# Patient Record
Sex: MALE | Race: WHITE | NOT HISPANIC OR LATINO | Employment: FULL TIME
[De-identification: names, ages, dates, MRNs, and addresses within clinical notes are randomized per-mention and may not be internally consistent; named-entity substitution may affect disease eponyms.]

---

## 2017-05-04 ENCOUNTER — RECORDS - HEALTHEAST (OUTPATIENT)
Dept: ADMINISTRATIVE | Facility: OTHER | Age: 42
End: 2017-05-04

## 2018-11-01 ENCOUNTER — COMMUNICATION - HEALTHEAST (OUTPATIENT)
Dept: TELEHEALTH | Facility: CLINIC | Age: 43
End: 2018-11-01

## 2018-11-01 ENCOUNTER — OFFICE VISIT - HEALTHEAST (OUTPATIENT)
Dept: INTERNAL MEDICINE | Facility: CLINIC | Age: 43
End: 2018-11-01

## 2018-11-01 DIAGNOSIS — Z12.5 PROSTATE CANCER SCREENING: ICD-10-CM

## 2018-11-01 DIAGNOSIS — Z00.00 PREVENTATIVE HEALTH CARE: ICD-10-CM

## 2018-11-01 LAB
ANION GAP SERPL CALCULATED.3IONS-SCNC: 13 MMOL/L (ref 5–18)
BUN SERPL-MCNC: 13 MG/DL (ref 8–22)
CALCIUM SERPL-MCNC: 10.6 MG/DL (ref 8.5–10.5)
CHLORIDE BLD-SCNC: 100 MMOL/L (ref 98–107)
CHOLEST SERPL-MCNC: 265 MG/DL
CO2 SERPL-SCNC: 28 MMOL/L (ref 22–31)
CREAT SERPL-MCNC: 1.17 MG/DL (ref 0.7–1.3)
FASTING STATUS PATIENT QL REPORTED: YES
GFR SERPL CREATININE-BSD FRML MDRD: >60 ML/MIN/1.73M2
GLUCOSE BLD-MCNC: 90 MG/DL (ref 70–125)
HDLC SERPL-MCNC: 76 MG/DL
LDLC SERPL CALC-MCNC: 172 MG/DL
POTASSIUM BLD-SCNC: 4.9 MMOL/L (ref 3.5–5)
PSA SERPL-MCNC: 1.2 NG/ML (ref 0–2.5)
SODIUM SERPL-SCNC: 141 MMOL/L (ref 136–145)
TRIGL SERPL-MCNC: 84 MG/DL

## 2018-11-01 RX ORDER — MULTIVITAMIN
1 CAPSULE ORAL DAILY
Status: SHIPPED | COMMUNITY
Start: 2018-11-01

## 2018-11-01 ASSESSMENT — MIFFLIN-ST. JEOR: SCORE: 1626.96

## 2018-11-02 ENCOUNTER — COMMUNICATION - HEALTHEAST (OUTPATIENT)
Dept: INTERNAL MEDICINE | Facility: CLINIC | Age: 43
End: 2018-11-02

## 2018-11-02 LAB — 25(OH)D3 SERPL-MCNC: 39.8 NG/ML (ref 30–80)

## 2021-06-02 VITALS — WEIGHT: 169 LBS | HEIGHT: 69 IN | BODY MASS INDEX: 25.03 KG/M2

## 2021-06-21 NOTE — PROGRESS NOTES
ASSESSMENT:  1. Preventative health care  Very healthy.  No symptoms.  Update labs  - Vitamin D, Total (25-Hydroxy)  - Lipid Cascade  - Basic Metabolic Panel    2. Prostate cancer screening  Family history of prostate cancer father and grandfather.  Recommend yearly PSA.  Digital rectal exam discussed  - PSA (Prostatic-Specific Antigen), Annual Screen      PLAN:  Patient Instructions   Update blood tests today    Yearly PSA's, lab       Orders Placed This Encounter   Procedures     PSA (Prostatic-Specific Antigen), Annual Screen     Vitamin D, Total (25-Hydroxy)     Lipid Cascade     Order Specific Question:   Fasting is required?     Answer:   Unknown     Basic Metabolic Panel     There are no discontinued medications.    Return in about 1 year (around 11/1/2019) for Annual physical or blood tests.    CHIEF COMPLAINT:  Chief Complaint   Patient presents with     Annual Exam       HISTORY OF PRESENT ILLNESS:  Antony is a 43 y.o. male presenting to the clinic today for an annual physical exam. He has not been seen for about five years and does not have any acute concerns today.     Health Maintenance: He would like to have a PSA checked due to his family history. He does not typically get flu shots and is not due for any other immunizations.     REVIEW OF SYSTEMS:   He has been feeling healthy. He does not have sinus problems or allergies. He sleeps well. Denies acid reflux. Bowels move fine and denies urine trouble. Has not had any asthma or breathing problems. No lower extremity edema. All other systems are negative.    PFSH:  He has a history of early prostate cancer in his paternal grandfather in his 60s. His father also had prostate cancer, which was diagnosed at 66. He does not have a family history of colon cancer. He exercises two or three times per week. He does not smoke. He has two children. He works in real estate finance.     History   Smoking Status     Never Smoker   Smokeless Tobacco     Never Used  "      Family History   Problem Relation Age of Onset     Prostate cancer Father      Prostate cancer Paternal Grandfather        Social History     Social History     Marital status:      Spouse name: N/A     Number of children: N/A     Years of education: N/A     Occupational History     Not on file.     Social History Main Topics     Smoking status: Never Smoker     Smokeless tobacco: Never Used     Alcohol use Not on file     Drug use: Not on file     Sexual activity: Not on file     Other Topics Concern     Not on file     Social History Narrative       Past Surgical History:   Procedure Laterality Date     [place] APPENDECTOMY      Description: Appendectomy;  Recorded: 06/19/2013;       Allergies   Allergen Reactions     Penicillins Anaphylaxis       Active Ambulatory Problems     Diagnosis Date Noted     No Active Ambulatory Problems     Resolved Ambulatory Problems     Diagnosis Date Noted     No Resolved Ambulatory Problems     No Additional Past Medical History       VITALS:  Vitals:    11/01/18 0927   BP: 106/74   Patient Site: Left Arm   Patient Position: Sitting   Cuff Size: Adult Regular   Pulse: 60   Weight: 169 lb (76.7 kg)   Height: 5' 9\" (1.753 m)     Wt Readings from Last 3 Encounters:   11/01/18 169 lb (76.7 kg)     Body mass index is 24.96 kg/(m^2).    PHYSICAL EXAM:  Constitutional:  Reveals an alert, pleasant, healthy appearing, well-groomed man.  Vitals:  Per nursing notes.  Ears:  Clear.  Oropharynx:  Without posterior nasal drainage or thrush.  Neck:  Supple, thyroid not palpable.  Cardiac:  Regular rate and rhythm without murmurs, rubs, or gallops. Legs without edema. Palpation of the radial pulse regular.  Lungs: Clear.  Respiratory effort normal.  Abdomen:   Bowel sounds positive, nontender, nondistended.  Neither liver nor spleen palpable.  Skin:   Without rash, bruise, or palpable lesions.  Rheumatologic: Normal joints and nails of the hands.  Neurologic:  Cranial nerves II-XII " intact.     Psychiatric:  Mood appropriate, memory intact.     MEDICATIONS:  Current Outpatient Prescriptions   Medication Sig Dispense Refill     multivitamin capsule Take 1 capsule by mouth daily.       No current facility-administered medications for this visit.        ADDITIONAL HISTORY SUMMARIZED (2): None.  DECISION TO OBTAIN EXTRA INFORMATION (1): None.   RADIOLOGY TESTS (1): None.  LABS (1): Labs from 6/19/2013 reviewed. Labs ordered.   MEDICINE TESTS (1): None.  INDEPENDENT REVIEW (2 each): None.     The visit lasted a total of 11 minutes face to face with the patient. Over 50% of the time was spent counseling and educating the patient about general health maintenance.    Renzo BLANCO, am scribing for and in the presence of, Dr. Melendrez.    IDr. Melendrez, personally performed the services described in this documentation, as scribed by Renzo Arroyo in my presence, and it is both accurate and complete.    Total data points: 1

## 2021-08-22 ENCOUNTER — HEALTH MAINTENANCE LETTER (OUTPATIENT)
Age: 46
End: 2021-08-22

## 2021-10-17 ENCOUNTER — HEALTH MAINTENANCE LETTER (OUTPATIENT)
Age: 46
End: 2021-10-17

## 2022-10-02 ENCOUNTER — HEALTH MAINTENANCE LETTER (OUTPATIENT)
Age: 47
End: 2022-10-02

## 2023-08-22 ASSESSMENT — ENCOUNTER SYMPTOMS
FREQUENCY: 0
CHILLS: 0
FEVER: 0
HEMATOCHEZIA: 0
COUGH: 0
EYE PAIN: 0
SORE THROAT: 0
MYALGIAS: 0
WEAKNESS: 0
JOINT SWELLING: 0
HEADACHES: 0
NAUSEA: 0
DIARRHEA: 0
NERVOUS/ANXIOUS: 0
SHORTNESS OF BREATH: 0
ARTHRALGIAS: 0
DYSURIA: 0
PALPITATIONS: 0
PARESTHESIAS: 0
DIZZINESS: 0
HEARTBURN: 0
HEMATURIA: 0
ABDOMINAL PAIN: 0
CONSTIPATION: 0

## 2023-08-29 ENCOUNTER — LAB (OUTPATIENT)
Dept: INTERNAL MEDICINE | Facility: CLINIC | Age: 48
End: 2023-08-29

## 2023-08-29 ENCOUNTER — OFFICE VISIT (OUTPATIENT)
Dept: INTERNAL MEDICINE | Facility: CLINIC | Age: 48
End: 2023-08-29
Payer: COMMERCIAL

## 2023-08-29 VITALS
HEART RATE: 60 BPM | HEIGHT: 70 IN | BODY MASS INDEX: 23.62 KG/M2 | OXYGEN SATURATION: 98 % | TEMPERATURE: 98 F | WEIGHT: 165 LBS | SYSTOLIC BLOOD PRESSURE: 109 MMHG | RESPIRATION RATE: 12 BRPM | DIASTOLIC BLOOD PRESSURE: 70 MMHG

## 2023-08-29 DIAGNOSIS — Z12.5 PROSTATE CANCER SCREENING: ICD-10-CM

## 2023-08-29 DIAGNOSIS — Z11.59 NEED FOR HEPATITIS C SCREENING TEST: ICD-10-CM

## 2023-08-29 DIAGNOSIS — Z11.4 SCREENING FOR HIV (HUMAN IMMUNODEFICIENCY VIRUS): ICD-10-CM

## 2023-08-29 DIAGNOSIS — Z12.11 SCREEN FOR COLON CANCER: ICD-10-CM

## 2023-08-29 DIAGNOSIS — Z00.00 PREVENTATIVE HEALTH CARE: Primary | ICD-10-CM

## 2023-08-29 LAB
ANION GAP SERPL CALCULATED.3IONS-SCNC: 11 MMOL/L (ref 7–15)
BUN SERPL-MCNC: 18.2 MG/DL (ref 6–20)
CALCIUM SERPL-MCNC: 9.6 MG/DL (ref 8.6–10)
CHLORIDE SERPL-SCNC: 104 MMOL/L (ref 98–107)
CHOLEST SERPL-MCNC: 233 MG/DL
CREAT SERPL-MCNC: 1.14 MG/DL (ref 0.67–1.17)
DEPRECATED HCO3 PLAS-SCNC: 28 MMOL/L (ref 22–29)
GFR SERPL CREATININE-BSD FRML MDRD: 78 ML/MIN/1.73M2
GLUCOSE SERPL-MCNC: 84 MG/DL (ref 70–99)
HDLC SERPL-MCNC: 69 MG/DL
LDLC SERPL CALC-MCNC: 114 MG/DL
NONHDLC SERPL-MCNC: 164 MG/DL
POTASSIUM SERPL-SCNC: 4.2 MMOL/L (ref 3.4–5.3)
PSA SERPL DL<=0.01 NG/ML-MCNC: 1.37 NG/ML (ref 0–3.5)
SODIUM SERPL-SCNC: 143 MMOL/L (ref 136–145)
TRIGL SERPL-MCNC: 248 MG/DL

## 2023-08-29 PROCEDURE — 86803 HEPATITIS C AB TEST: CPT | Performed by: INTERNAL MEDICINE

## 2023-08-29 PROCEDURE — 87389 HIV-1 AG W/HIV-1&-2 AB AG IA: CPT | Performed by: INTERNAL MEDICINE

## 2023-08-29 PROCEDURE — 80061 LIPID PANEL: CPT | Performed by: INTERNAL MEDICINE

## 2023-08-29 PROCEDURE — G0103 PSA SCREENING: HCPCS | Performed by: INTERNAL MEDICINE

## 2023-08-29 PROCEDURE — 36415 COLL VENOUS BLD VENIPUNCTURE: CPT | Performed by: INTERNAL MEDICINE

## 2023-08-29 PROCEDURE — 80048 BASIC METABOLIC PNL TOTAL CA: CPT | Performed by: INTERNAL MEDICINE

## 2023-08-29 PROCEDURE — 99386 PREV VISIT NEW AGE 40-64: CPT | Performed by: INTERNAL MEDICINE

## 2023-08-29 ASSESSMENT — ENCOUNTER SYMPTOMS
PALPITATIONS: 0
DIZZINESS: 0
JOINT SWELLING: 0
CONSTIPATION: 0
DIARRHEA: 0
PARESTHESIAS: 0
CHILLS: 0
FEVER: 0
SHORTNESS OF BREATH: 0
ABDOMINAL PAIN: 0
HEMATOCHEZIA: 0
COUGH: 0
NAUSEA: 0
MYALGIAS: 0
ARTHRALGIAS: 0
EYE PAIN: 0
SORE THROAT: 0
DYSURIA: 0
NERVOUS/ANXIOUS: 0
HEMATURIA: 0
WEAKNESS: 0
FREQUENCY: 0
HEADACHES: 0
HEARTBURN: 0

## 2023-08-29 NOTE — PATIENT INSTRUCTIONS
[facility]al for low risk cancer screening    Update labs:  PSA, lipds, kidney tests and sugar    New flu and new covid this fall    Look into shingles and tdap at your pharmacy

## 2023-08-29 NOTE — PROGRESS NOTES
"Yearly Preventive Health Visit--Face to Face    ASSESSMENT/PLAN:     ASSESSMENT and PLAN:  1. Preventative health care  - REVIEW OF HEALTH MAINTENANCE PROTOCOL ORDERS  - TDAP 10-64Y (ADACEL,BOOSTRIX); Future  - ZOSTER RECOMBINANT ADJUVANTED (SHINGRIX); Future  - Basic metabolic panel; Future  - Lipid Profile; Future  - Basic metabolic panel  - Lipid Profile    2. Screen for colon cancer  Conservative cancer screening is reasonable  - COLOGUARD(EXACT SCIENCES); Future    3. Screening for HIV (human immunodeficiency virus)  - HIV Antigen Antibody Combo; Future  - HIV Antigen Antibody Combo    4. Need for hepatitis C screening test  - Hepatitis C Screen Reflex to HCV RNA Quant and Genotype; Future  - Hepatitis C Screen Reflex to HCV RNA Quant and Genotype    5. Prostate cancer screening  - Prostate Specific Antigen Screen; Future  - Prostate Specific Antigen Screen       Patient Instructions   Cologuard for low risk cancer screening    Update labs:  PSA, lipds, kidney tests and sugar    New flu and new covid this fall    Look into shingles and tdap at your pharmacy              Return in about 1 year (around 8/29/2024).       COUNSELING:   Reviewed preventive health counseling, as reflected in patient instructions       Regular exercise       Consider Hep C screening for all patients one time for ages 18-79 years       Colorectal cancer screening       Prostate cancer screening    Estimated body mass index is 23.68 kg/m  as calculated from the following:    Height as of this encounter: 1.778 m (5' 10\").    Weight as of this encounter: 74.8 kg (165 lb).           SUBJECTIVE:     Antony Cristina is an 48 year old who presents for preventative health visit.         8/29/2023     1:44 PM   Additional Questions   Roomed by jeffrey rivas   Accompanied by self         8/29/2023     1:44 PM   Patient Reported Additional Medications   Patient reports taking the following new medications no       CHIEF COMPLAINT:  Chief Complaint "   Patient presents with    Physical    Recheck Medication       HPI: Feeling very well.  Concerns with prostate and breast cancer running in the family.  No chest pain or shortness of breath    Review of Systems   Constitutional:  Negative for chills and fever.   HENT:  Negative for congestion, ear pain, hearing loss and sore throat.    Eyes:  Negative for pain and visual disturbance.   Respiratory:  Negative for cough and shortness of breath.    Cardiovascular:  Negative for chest pain, palpitations and peripheral edema.   Gastrointestinal:  Negative for abdominal pain, constipation, diarrhea, heartburn, hematochezia and nausea.   Genitourinary:  Negative for dysuria, frequency, genital sores, hematuria, impotence, penile discharge and urgency.   Musculoskeletal:  Negative for arthralgias, joint swelling and myalgias.   Skin:  Negative for rash.   Neurological:  Negative for dizziness, weakness, headaches and paresthesias.   Psychiatric/Behavioral:  Negative for mood changes. The patient is not nervous/anxious.      Review of Systems:        Patient Care Team:  Pineda Melendrez MD as PCP - General (Internal Medicine)     There is no problem list on file for this patient.    No past medical history on file.   Past Surgical History:   Procedure Laterality Date    ZZC APPENDECTOMY      Description: Appendectomy;  Recorded: 06/19/2013;      Family History   Problem Relation Age of Onset    Prostate Cancer Father     Prostate Cancer Paternal Grandfather     Cancer Paternal Grandmother       Social History     Socioeconomic History    Marital status:      Spouse name: Not on file    Number of children: Not on file    Years of education: Not on file    Highest education level: Not on file   Occupational History    Not on file   Tobacco Use    Smoking status: Never    Smokeless tobacco: Never   Vaping Use    Vaping Use: Never used   Substance and Sexual Activity    Alcohol use: Not on file    Drug use: Not on  file    Sexual activity: Not on file   Other Topics Concern    Not on file   Social History Narrative    Not on file     Social Determinants of Health     Financial Resource Strain: Not on file   Food Insecurity: Not on file   Transportation Needs: Not on file   Physical Activity: Not on file   Stress: Not on file   Social Connections: Not on file   Intimate Partner Violence: Not on file   Housing Stability: Not on file      Wife had complications of a colonoscopy    Social History     Social History Narrative    Not on file       Current Outpatient Medications   Medication Sig Dispense Refill    multivitamin capsule [MULTIVITAMIN CAPSULE] Take 1 capsule by mouth daily.          Patient has been advised of split billing requirements and indicates understanding: Yes    Healthy Habits:     Getting at least 3 servings of Calcium per day:  Yes    Bi-annual eye exam:  Yes    Dental care twice a year:  Yes    Sleep apnea or symptoms of sleep apnea:  None    Diet:  Regular (no restrictions)    Frequency of exercise:  2-3 days/week    Duration of exercise:  30-45 minutes    Taking medications regularly:  Yes    Medication side effects:  None    Additional concerns today:  No      Today's PHQ-2 Score:       8/29/2023     1:36 PM   PHQ-2 ( 1999 Pfizer)   Q1: Little interest or pleasure in doing things 0   Q2: Feeling down, depressed or hopeless 0   PHQ-2 Score 0   Q1: Little interest or pleasure in doing things Not at all   Q2: Feeling down, depressed or hopeless Not at all   PHQ-2 Score 0       Abuse: Current or Past(Physical, Sexual or Emotional)- No   Do you feel safe in your environment? Yes     Have you ever done Advance Care Planning? (For example, a Health Directive, POLST, or a discussion with a medical provider or your loved ones about your wishes): No, advance care planning information given to patient to review.  Patient plans to discuss their wishes with loved ones or provider.      Reviewed and updated as needed  "this visit by clinical staff   Tobacco  Allergies  Meds  Problems               OBJECTIVE:   VITALS:  Vitals:    08/29/23 1346   BP: 109/70   BP Location: Left arm   Patient Position: Sitting   Cuff Size: Adult Regular   Pulse: 60   Resp: 12   Temp: 98  F (36.7  C)   TempSrc: Oral   SpO2: 98%   Weight: 74.8 kg (165 lb)   Height: 1.778 m (5' 10\")     /70 (BP Location: Left arm, Patient Position: Sitting, Cuff Size: Adult Regular)   Pulse 60   Temp 98  F (36.7  C) (Oral)   Resp 12   Ht 1.778 m (5' 10\")   Wt 74.8 kg (165 lb)   SpO2 98%   BMI 23.68 kg/m   Estimated body mass index is 23.68 kg/m  as calculated from the following:    Height as of this encounter: 1.778 m (5' 10\").    Weight as of this encounter: 74.8 kg (165 lb).    PHYSICAL EXAM:  Constitutional:  Reveals alert pleasant man who ambulates without difficulty  Palpation of radial pulse regular   Ears:  Clear without wax   Thyroid:  Non palpable   Cardiac: Regular rate and rhythm   Lungs: Clear.  Respiratory effort normal.  Edema of Legs: None   Psychiatric:  Alert and oriented         He reports that he has never smoked. He has never used smokeless tobacco.      No results for input(s): HGB, WBC, NA, POTASSIUM, CR, A1C, PSA, URIC, B12, TSH, VITDT, SED, CRP in the last 36380 hours.      Start Time: 2:07 PM  End time:  2:29 PM  Visit plus orders: 22 minutes  Dictation time:  3 minutes    The visit lasted a total of 25 minutes       Pineda Melendrez MD  [facility]  "

## 2023-08-29 NOTE — PROGRESS NOTES
SUBJECTIVE:   CC: Antony is an 48 year old who presents for preventative health visit.       8/29/2023     1:44 PM   Additional Questions   Roomed by jeffrey rivas   Accompanied by self         8/29/2023     1:44 PM   Patient Reported Additional Medications   Patient reports taking the following new medications no       Healthy Habits:     Getting at least 3 servings of Calcium per day:  Yes    Bi-annual eye exam:  Yes    Dental care twice a year:  Yes    Sleep apnea or symptoms of sleep apnea:  None    Diet:  Regular (no restrictions)    Frequency of exercise:  2-3 days/week    Duration of exercise:  30-45 minutes    Taking medications regularly:  Yes    Medication side effects:  None    Additional concerns today:  No      Today's PHQ-2 Score:       8/29/2023     1:36 PM   PHQ-2 ( 1999 Pfizer)   Q1: Little interest or pleasure in doing things 0   Q2: Feeling down, depressed or hopeless 0   PHQ-2 Score 0   Q1: Little interest or pleasure in doing things Not at all   Q2: Feeling down, depressed or hopeless Not at all   PHQ-2 Score 0           {Add if <65 person on Medicare  - Required Questions (Optional):802012}  {Outside tests to abstract? :341183}    {additional problems to add (Optional):791557}  Have you ever done Advance Care Planning? (For example, a Health Directive, POLST, or a discussion with a medical provider or your loved ones about your wishes): No, advance care planning information given to patient to review.  Patient plans to discuss their wishes with loved ones or provider.      Social History     Tobacco Use    Smoking status: Never    Smokeless tobacco: Never   Substance Use Topics    Alcohol use: Not on file     {Rooming staff  Click this link to complete the Prescreen if response below is not for today's visit  Alcohol Use Prescreen >3 drinks/day or > 7 drinks/week.  If the prescreen question answer is YES, complete the full AUDIT  :199464}        8/22/2023     4:41 PM   Alcohol Use   Prescreen: >3  "drinks/day or >7 drinks/week? Not Applicable   {add AUDIT responses (Optional) (A score of 7 for adult men is an indication of hazardous drinking; a score of 8 or more is an indication of an alcohol use disorder.  A score of 7 or more for adult women is an indication of hazardous drinking or an alchohol use disorder):760105}    Last PSA:   Prostate Specific Antigen Screen   Date Value Ref Range Status   11/01/2018 1.2 0.0 - 2.5 ng/mL Final       Reviewed orders with patient. Reviewed health maintenance and updated orders accordingly - { :613177}  {Chronicprobdata (optional):365564}    Reviewed and updated as needed this visit by clinical staff   Tobacco  Allergies  Meds              Reviewed and updated as needed this visit by Provider                 {HISTORY OPTIONS (Optional):512261}    Review of Systems   Constitutional:  Negative for chills and fever.   HENT:  Negative for congestion, ear pain, hearing loss and sore throat.    Eyes:  Negative for pain and visual disturbance.   Respiratory:  Negative for cough and shortness of breath.    Cardiovascular:  Negative for chest pain, palpitations and peripheral edema.   Gastrointestinal:  Negative for abdominal pain, constipation, diarrhea, heartburn, hematochezia and nausea.   Genitourinary:  Negative for dysuria, frequency, genital sores, hematuria, impotence, penile discharge and urgency.   Musculoskeletal:  Negative for arthralgias, joint swelling and myalgias.   Skin:  Negative for rash.   Neurological:  Negative for dizziness, weakness, headaches and paresthesias.   Psychiatric/Behavioral:  Negative for mood changes. The patient is not nervous/anxious.      {MALE ROS (Optional):166324}    OBJECTIVE:   /70 (BP Location: Left arm, Patient Position: Sitting, Cuff Size: Adult Regular)   Pulse 60   Temp 98  F (36.7  C) (Oral)   Resp 12   Ht 1.778 m (5' 10\")   Wt 74.8 kg (165 lb)   SpO2 98%   BMI 23.68 kg/m      Physical Exam  {Exam Choices " "(Optional):398489}    {Diagnostic Test Results (Optional):186008}    ASSESSMENT/PLAN:   {Diag Picklist:265088}    Patient has been advised of split billing requirements and indicates understanding: Yes      COUNSELING:   {MALE COUNSELING MESSAGES:006349::\"Reviewed preventive health counseling, as reflected in patient instructions\"}        He reports that he has never smoked. He has never used smokeless tobacco.      {Counseling Resources  [place] Preventive Services Task Force  Cholesterol Screening  Health diet/nutrition  Pooled Cohorts Equation Calculator  [employer]'s MyPlate  ASA Prophylaxis  Lung CA Screening  Osteoporosis prevention/bone health :610079}  {Prostate Cancer Screening  Consider for men 55-69 per guidance from USPSTF :457097}    Pineda Melendrez MD  [facility]  "

## 2023-08-30 LAB
HCV AB SERPL QL IA: NONREACTIVE
HIV 1+2 AB+HIV1 P24 AG SERPL QL IA: NONREACTIVE

## 2023-09-12 LAB — NONINV COLON CA DNA+OCC BLD SCRN STL QL: NEGATIVE

## 2024-01-25 ENCOUNTER — TRANSFERRED RECORDS (OUTPATIENT)
Dept: HEALTH INFORMATION MANAGEMENT | Facility: CLINIC | Age: 49
End: 2024-01-25
Payer: COMMERCIAL

## 2024-07-30 ENCOUNTER — PATIENT OUTREACH (OUTPATIENT)
Dept: CARE COORDINATION | Facility: CLINIC | Age: 49
End: 2024-07-30
Payer: COMMERCIAL

## 2024-08-13 ENCOUNTER — PATIENT OUTREACH (OUTPATIENT)
Dept: CARE COORDINATION | Facility: CLINIC | Age: 49
End: 2024-08-13
Payer: COMMERCIAL

## 2024-10-05 ENCOUNTER — HEALTH MAINTENANCE LETTER (OUTPATIENT)
Age: 49
End: 2024-10-05

## 2025-04-22 ENCOUNTER — TRANSFERRED RECORDS (OUTPATIENT)
Dept: HEALTH INFORMATION MANAGEMENT | Facility: CLINIC | Age: 50
End: 2025-04-22
Payer: COMMERCIAL

## 2025-04-28 ENCOUNTER — TRANSFERRED RECORDS (OUTPATIENT)
Dept: HEALTH INFORMATION MANAGEMENT | Facility: CLINIC | Age: 50
End: 2025-04-28

## 2025-05-09 ENCOUNTER — TRANSFERRED RECORDS (OUTPATIENT)
Dept: HEALTH INFORMATION MANAGEMENT | Facility: CLINIC | Age: 50
End: 2025-05-09

## 2025-05-29 ENCOUNTER — TRANSFERRED RECORDS (OUTPATIENT)
Dept: HEALTH INFORMATION MANAGEMENT | Facility: CLINIC | Age: 50
End: 2025-05-29
Payer: COMMERCIAL

## 2025-06-18 ENCOUNTER — OFFICE VISIT (OUTPATIENT)
Dept: FAMILY MEDICINE | Facility: CLINIC | Age: 50
End: 2025-06-18
Payer: COMMERCIAL

## 2025-06-18 VITALS
DIASTOLIC BLOOD PRESSURE: 68 MMHG | TEMPERATURE: 97.8 F | BODY MASS INDEX: 25.2 KG/M2 | WEIGHT: 176 LBS | OXYGEN SATURATION: 98 % | RESPIRATION RATE: 14 BRPM | HEIGHT: 70 IN | HEART RATE: 76 BPM | SYSTOLIC BLOOD PRESSURE: 110 MMHG

## 2025-06-18 DIAGNOSIS — N40.1 BENIGN PROSTATIC HYPERPLASIA WITH LOWER URINARY TRACT SYMPTOMS, SYMPTOM DETAILS UNSPECIFIED: ICD-10-CM

## 2025-06-18 DIAGNOSIS — R91.8 PULMONARY NODULES: ICD-10-CM

## 2025-06-18 DIAGNOSIS — R97.20 ELEVATED PROSTATE SPECIFIC ANTIGEN (PSA): ICD-10-CM

## 2025-06-18 DIAGNOSIS — R33.9 RETENTION OF URINE: ICD-10-CM

## 2025-06-18 DIAGNOSIS — Z00.00 HEALTHCARE MAINTENANCE: Primary | ICD-10-CM

## 2025-06-18 LAB
ALT SERPL W P-5'-P-CCNC: 29 U/L (ref 0–70)
CHOLEST SERPL-MCNC: 250 MG/DL
CREAT SERPL-MCNC: 1.04 MG/DL (ref 0.67–1.17)
EGFRCR SERPLBLD CKD-EPI 2021: 86 ML/MIN/1.73M2
FASTING STATUS PATIENT QL REPORTED: YES
GLUCOSE BLD-MCNC: 79 MG/DL (ref 60–99)
HDLC SERPL-MCNC: 60 MG/DL
LDLC SERPL CALC-MCNC: 171 MG/DL
NONHDLC SERPL-MCNC: 190 MG/DL
PSA SERPL DL<=0.01 NG/ML-MCNC: 4.75 NG/ML (ref 0–3.5)
TRIGL SERPL-MCNC: 93 MG/DL

## 2025-06-18 PROCEDURE — 99396 PREV VISIT EST AGE 40-64: CPT | Mod: 25 | Performed by: FAMILY MEDICINE

## 2025-06-18 PROCEDURE — 82565 ASSAY OF CREATININE: CPT | Performed by: FAMILY MEDICINE

## 2025-06-18 PROCEDURE — 90677 PCV20 VACCINE IM: CPT | Performed by: FAMILY MEDICINE

## 2025-06-18 PROCEDURE — 3078F DIAST BP <80 MM HG: CPT | Performed by: FAMILY MEDICINE

## 2025-06-18 PROCEDURE — 80061 LIPID PANEL: CPT | Performed by: FAMILY MEDICINE

## 2025-06-18 PROCEDURE — 3050F LDL-C >= 130 MG/DL: CPT | Performed by: FAMILY MEDICINE

## 2025-06-18 PROCEDURE — 84153 ASSAY OF PSA TOTAL: CPT | Performed by: FAMILY MEDICINE

## 2025-06-18 PROCEDURE — 36415 COLL VENOUS BLD VENIPUNCTURE: CPT | Performed by: FAMILY MEDICINE

## 2025-06-18 PROCEDURE — 82947 ASSAY GLUCOSE BLOOD QUANT: CPT | Performed by: FAMILY MEDICINE

## 2025-06-18 PROCEDURE — 84460 ALANINE AMINO (ALT) (SGPT): CPT | Performed by: FAMILY MEDICINE

## 2025-06-18 PROCEDURE — 3074F SYST BP LT 130 MM HG: CPT | Performed by: FAMILY MEDICINE

## 2025-06-18 PROCEDURE — 99213 OFFICE O/P EST LOW 20 MIN: CPT | Mod: 25 | Performed by: FAMILY MEDICINE

## 2025-06-18 PROCEDURE — 90471 IMMUNIZATION ADMIN: CPT | Performed by: FAMILY MEDICINE

## 2025-06-18 RX ORDER — TAMSULOSIN HYDROCHLORIDE 0.4 MG/1
CAPSULE ORAL
COMMUNITY

## 2025-06-18 RX ORDER — NITROFURANTOIN 25; 75 MG/1; MG/1
CAPSULE ORAL
COMMUNITY
Start: 2025-04-09

## 2025-06-18 RX ORDER — SULFACETAMIDE SODIUM, SULFUR 100; 50 MG/G; MG/G
EMULSION TOPICAL
COMMUNITY
Start: 2025-05-30

## 2025-06-18 RX ORDER — CLINDAMYCIN PHOSPHATE 10 UG/ML
LOTION TOPICAL
COMMUNITY

## 2025-06-18 RX ORDER — DOXYCYCLINE HYCLATE 50 MG/1
CAPSULE ORAL
COMMUNITY
Start: 2012-06-01

## 2025-06-18 RX ORDER — SILDENAFIL 100 MG/1
TABLET, FILM COATED ORAL
COMMUNITY
Start: 2024-06-01

## 2025-06-18 NOTE — ASSESSMENT & PLAN NOTE
Very high PSA surrounding what seems like it probably was some degree of prostatitis with urinary retention.  Recheck PSA today.  Interested in follow-up with the [facility] if there is concerned about malignancy.

## 2025-06-18 NOTE — PROGRESS NOTES
Adult Male Physical  A/P  Pulmonary nodules  Noted incidentally on CT scan that was done with episode of urinary retention.  Fairly significantly sized nodules, one is big is 1.2 cm  This was done [facility], I saw it on his portal on his phone.  Will follow the recommendation with CT scan 2 to 3 months out.  Patient has never been a smoker.    Elevated prostate specific antigen (PSA)  Very high PSA surrounding what seems like it probably was some degree of prostatitis with urinary retention.  Recheck PSA today.  Interested in follow-up with the [facility] if there is concerned about malignancy.    Healthcare maintenance  Pneumococcal shot, up-to-date on colon cancer screening with Cologuard a couple of years ago.    Retention of urine  So urology, episode in April where he needed an indwelling catheter for some time.    Benign prostatic hyperplasia with lower urinary tract symptoms, symptom details unspecified  Long-term tamsulosin, history of urinary retention.  Briefly discussed option of finasteride.  Declined for now.  Very elevated PSA.  Recheck today         HPI  Current Concerns/ Questions  50-year-old, new patient to our clinic, previously admitted way.    Here for physical exam.  Issues discussed above.    Currently feeling well.  Urinating reasonably well with tamsulosin.  Very active, plays hockey a couple of times per week.  PFSH:  Current medications reviewed as follows:  Current Outpatient Medications   Medication Sig Dispense Refill    doxycycline hyclate (VIBRAMYCIN) 50 MG capsule TAKE 1 CAPSULE TWICE A DAY BY MOUTH WITH FOOD AND A LARGE GLASS OF WATER      nitroFURantoin macrocrystal-monohydrate (MACROBID) 100 MG capsule take 1 capsule by mouth twice daily for 7 days      sildenafil (VIAGRA) 100 MG tablet 100mg 1 week      Sulfacetamide Sodium-Sulfur 10-5 % LIQD APPLY EXTERNALLY TO FACE DAILY.      clindamycin (CLEOCIN T) 1 % external lotion APPLY TOPICALLY TO FACE EVERY DAY AS NEEDED       multivitamin capsule [MULTIVITAMIN CAPSULE] Take 1 capsule by mouth daily.      tamsulosin (FLOMAX) 0.4 MG capsule 0.4mg 1/day       No current facility-administered medications for this visit.      Patient Active Problem List   Diagnosis    Retention of urine    Pulmonary nodules    Elevated prostate specific antigen (PSA)    Healthcare maintenance    Benign prostatic hyperplasia with lower urinary tract symptoms, symptom details unspecified     Past Medical History:   Diagnosis Date    Malignant melanoma of skin of upper limb, including shoulder, left (H)      Past Surgical History:   Procedure Laterality Date    [facility] APPENDECTOMY      Description: Appendectomy;  Recorded: 06/19/2013;     Family History   Problem Relation Age of Onset    Breast Cancer Mother     Prostate Cancer Father     Cancer Paternal Grandmother     Prostate Cancer Paternal Grandfather      History   Smoking Status    Never   Smokeless Tobacco    Never       Social History     Social History Narrative    , semiworking- real estate    2 children    Hockey, yoga     Immunization History   Administered Date(s) Administered    COVID-19 12+ (Pfizer) 11/10/2024    COVID-19 Bivalent 18+ (Moderna) 09/12/2022    COVID-19 MONOVALENT 12+ (Pfizer) 03/21/2021, 04/13/2021    COVID-19 Monovalent 18+ (Moderna) 11/22/2021    COVID-19 Vaccine, Unspecified 05/15/2024, 11/01/2024    Flu, Unspecified 05/15/2024, 11/01/2024    Influenza (IIV3) PF 10/01/2021    Influenza Vaccine >6 months,quad, PF 11/21/2023    Influenza, Split Virus, Trivalent, Pf (Fluzone\Fluarix) 11/10/2024    Influenza,INJ,MDCK,PF,Quad >6mo(Flucelvax) 10/02/2020, 10/25/2022    Pneumococcal 20 valent Conjugate (Prevnar 20) 06/18/2025    TDAP (Adacel,Boostrix) 06/27/2009, 05/21/2024    Zoster recombinant adjuvanted (Shingrix) 11/21/2023, 05/21/2024    Zoster vaccine, live 05/20/2024     Body mass index is 25.2 kg/m .        Objective  Physical Exam  Vitals:    06/18/25 1017   BP: 110/68   BP  "Location: Right arm   Patient Position: Sitting   Cuff Size: Adult Regular   Pulse: 76   Resp: 14   Temp: 97.8  F (36.6  C)   TempSrc: Temporal   SpO2: 98%   Weight: 79.8 kg (176 lb)   Height: 1.78 m (5' 10.08\")       Gen- alert and oriented x3, no acute distress  HEENT- Normocephalic atraumatic   pupils equal and reactive, EOMI.    TMs visualized and normal, ear canals normal.    Mouth moist with normal mucosa no ulceration, dentition normal or in good repair.  Neck- supple, no adenopathy or thyromegaly  Chest- Normal chest wall apperance, normal inspiration and expiration.  Clear to asculation.  CV- Regular rate and rhythm, normal tones, no murmus, gallops or rubs.  Abd-  Soft, nodistended, nontender.  Normal bowel sounds, no mass or organ enlargement.  Genitalia-  was not done  KERRY: was not done  Ext- Atraumatic,  No synovial thickening. Good perfusion, no edema. Periph pulses detected  Skin- warm and dry, no rash  Neuro- Cranial nerves grossly intact.  Normal gait, normal strength.  Coordination intact.    Diagnostics  Results for orders placed or performed in visit on 06/18/25   Glucose, whole blood     Status: Normal   Result Value Ref Range    Glucose Whole Blood 79 60 - 99 mg/dL                     Answers submitted by the patient for this visit:  General Questionnaire (Submitted on 6/18/2025)  Chief Complaint: Chronic problems general questions HPI Form  What is the reason for your visit today? : Want to see new doctor and recommended a check in with doctor from [facility]  Test PSA and look into chest nodules from CT Scan  How many days per week do you miss taking your medication?: 1  Questionnaire about: Chronic problems general questions HPI Form (Submitted on 6/18/2025)  Chief Complaint: Chronic problems general questions HPI Form    "

## 2025-06-18 NOTE — PROGRESS NOTES
Preventive Care Visit  [facility]  Douglas Browne MD, Family Medicine  Jun 18, 2025  {Provider  Link to [facility] :177867}    {PROVIDER CHARTING PREFERENCE:518496}    Aliya Graf is a 50 year old, presenting for the following:  Follow Up (CT scan per urology ) and Physical        6/18/2025    10:14 AM   Additional Questions   Roomed by Tia   Accompanied by Self          Healthy Habits:     Taking medications regularly:  1  History of Present Illness       Reason for visit:  Want to see new doctor and recommended a check in with doctor from [facility]  Test PSA and look into chest nodules from CT Scan He is missing 1 dose(s) of medications per week.    ***   {MA/LPN/RN Pre-Provider Visit Orders- hCG/UA/Strep (Optional):372633}  {SUPERLIST (Optional):818314}  {additonal problems for provider to add (Optional):764863}  Advance Care Planning  {The storyboard will display whether the patient has ACP docs on file. Hover over the Code section in the storyboard to access the ACP documents. :836298}  {(AWV REQUIRED) Advance Care Planning Reviewed:877413}        6/18/2025   General Health   How would you rate your overall physical health? Good         6/18/2025   Nutrition   Three or more servings of calcium each day? Yes   Diet: Regular (no restrictions)   How many servings of fruit and vegetables per day? (!) 2-3   How many sweetened beverages each day? (!) 2         8/22/2023   Exercise   Frequency of exercise: 2-3 days/week         6/18/2025   Social Factors   Worry food won't last until get money to buy more No   Food not last or not have enough money for food? No   Do you have housing? (Housing is defined as stable permanent housing and does not include staying outside in a car, in a tent, in an abandoned building, in an overnight shelter, or couch-surfing.) Yes   Are you worried about losing your housing? No   Lack of transportation? No   Unable to get utilities (heat,electricity)? No          6/18/2025   Fall Risk   Fallen 2 or more times in the past year? No    Trouble with walking or balance? No        Proxy-reported          6/18/2025   Dental   Dentist two times every year? Yes         Today's PHQ-2 Score:       6/18/2025    10:16 AM   PHQ-2 ( 1999 [employer])   Q1: Little interest or pleasure in doing things 0    Q2: Feeling down, depressed or hopeless 0    PHQ-2 Score 0    Q1: Little interest or pleasure in doing things Not at all   Q2: Feeling down, depressed or hopeless Not at all   PHQ-2 Score 0       Proxy-reported           6/18/2025   Substance Use   Alcohol more than 3/day or more than 7/wk Not Applicable   Do you use any other substances recreationally? No     Social History     Tobacco Use    Smoking status: Never    Smokeless tobacco: Never   Vaping Use    Vaping status: Never Used     {Provider  If there are gaps in the social history shown above, please follow the link to update and then refresh the note Link to Social and Substance History :316830}      6/18/2025   STI Screening   New sexual partner(s) since last STI/HIV test? No   ASCVD Risk   The 10-year ASCVD risk score (Gilberto MAYORGA, et al., 2019) is: 2.9%    Values used to calculate the score:      Age: 50 years      Sex: Male      Is Non- : No      Diabetic: No      Tobacco smoker: No      Systolic Blood Pressure: 110 mmHg      Is BP treated: No      HDL Cholesterol: 69 mg/dL      Total Cholesterol: 233 mg/dL    {Link to Fracture Risk Assessment Tool (Optional):986778}    {Provider  REQUIRED FOR AWV Use the storyboard to review patient history, after sections have been marked as reviewed, refresh note to capture documentation:464586}   Reviewed and updated as needed this visit by Provider                    {HISTORY OPTIONS (Optional):976032}    {ROS Picklists (Optional):401886}     Objective    Exam  /68 (BP Location: Right arm, Patient Position: Sitting, Cuff Size: Adult Regular)    "Pulse 76   Temp 97.8  F (36.6  C) (Temporal)   Resp 14   Ht 1.78 m (5' 10.08\")   Wt 79.8 kg (176 lb)   SpO2 98%   BMI 25.20 kg/m     Estimated body mass index is 25.2 kg/m  as calculated from the following:    Height as of this encounter: 1.78 m (5' 10.08\").    Weight as of this encounter: 79.8 kg (176 lb).    Physical Exam  {Exam Choices (Optional):599855}        Signed Electronically by: Douglas Browne MD  {Email feedback regarding this note to [email or web address]   :122055}  "

## 2025-06-18 NOTE — ASSESSMENT & PLAN NOTE
Long-term tamsulosin, history of urinary retention.  Briefly discussed option of finasteride.  Declined for now.  Very elevated PSA.  Recheck today

## 2025-06-18 NOTE — ASSESSMENT & PLAN NOTE
Noted incidentally on CT scan that was done with episode of urinary retention.  Fairly significantly sized nodules, one is big is 1.2 cm  This was done [facility], I saw it on his portal on his phone.  Will follow the recommendation with CT scan 2 to 3 months out.  Patient has never been a smoker.

## 2025-06-18 NOTE — PROGRESS NOTES
Prior to immunization administration, verified patients identity using patient s name and date of birth. Please see Immunization Activity for additional information.     Screening Questionnaire for Adult Immunization    Are you sick today?   No   Do you have allergies to medications, food, a vaccine component or latex?   Yes   Have you ever had a serious reaction after receiving a vaccination?   No   Do you have a long-term health problem with heart, lung, kidney, or metabolic disease (e.g., diabetes), asthma, a blood disorder, no spleen, complement component deficiency, a cochlear implant, or a spinal fluid leak?  Are you on long-term aspirin therapy?   No   Do you have cancer, leukemia, HIV/AIDS, or any other immune system problem?   No   Do you have a parent, brother, or sister with an immune system problem?   No   In the past 3 months, have you taken medications that affect  your immune system, such as prednisone, other steroids, or anticancer drugs; drugs for the treatment of rheumatoid arthritis, Crohn s disease, or psoriasis; or have you had radiation treatments?   No   Have you had a seizure, or a brain or other nervous system problem?   No   During the past year, have you received a transfusion of blood or blood    products, or been given immune (gamma) globulin or antiviral drug?   No   For women: Are you pregnant or is there a chance you could become       pregnant during the next month?   No   Have you received any vaccinations in the past 4 weeks?   No     Immunization questionnaire was positive for at least one answer.  Notified .      Patient instructed to remain in clinic for 15 minutes afterwards, and to report any adverse reactions.     Screening performed by Andra Wright CMA on 6/18/2025 at 10:19 AM.        Answers submitted by the patient for this visit:  General Questionnaire (Submitted on 6/18/2025)  Chief Complaint: Chronic problems general questions HPI Form  What is the reason  for your visit today? : Want to see new doctor and recommended a check in with doctor from [facility]  Test PSA and look into chest nodules from CT Scan  How many days per week do you miss taking your medication?: 1  Questionnaire about: Chronic problems general questions HPI Form (Submitted on 6/18/2025)  Chief Complaint: Chronic problems general questions HPI Form

## 2025-06-19 ENCOUNTER — RESULTS FOLLOW-UP (OUTPATIENT)
Dept: INTERNAL MEDICINE | Facility: CLINIC | Age: 50
End: 2025-06-19

## 2025-06-19 ENCOUNTER — RESULTS FOLLOW-UP (OUTPATIENT)
Dept: FAMILY MEDICINE | Facility: CLINIC | Age: 50
End: 2025-06-19

## 2025-06-19 DIAGNOSIS — R97.20 ELEVATED PROSTATE SPECIFIC ANTIGEN (PSA): Primary | ICD-10-CM

## 2025-08-04 ENCOUNTER — HOSPITAL ENCOUNTER (OUTPATIENT)
Dept: CT IMAGING | Facility: CLINIC | Age: 50
Discharge: HOME | End: 2025-08-04
Attending: FAMILY MEDICINE | Admitting: FAMILY MEDICINE
Payer: COMMERCIAL

## 2025-08-04 DIAGNOSIS — R91.8 PULMONARY NODULES: ICD-10-CM

## 2025-08-04 PROCEDURE — 250N000009 HC RX 250: Performed by: FAMILY MEDICINE

## 2025-08-04 PROCEDURE — 250N000011 HC RX IP 250 OP 636: Performed by: FAMILY MEDICINE

## 2025-08-04 PROCEDURE — 71260 CT THORAX DX C+: CPT

## 2025-08-04 RX ORDER — IOPAMIDOL 755 MG/ML
500 INJECTION, SOLUTION INTRAVASCULAR ONCE
Status: COMPLETED | OUTPATIENT
Start: 2025-08-04 | End: 2025-08-04

## 2025-08-04 RX ADMIN — IOPAMIDOL 89 ML: 755 INJECTION, SOLUTION INTRAVENOUS at 09:53

## 2025-08-04 RX ADMIN — SODIUM CHLORIDE 60 ML: 9 INJECTION, SOLUTION INTRAVENOUS at 09:53

## 2025-08-05 ENCOUNTER — PATIENT OUTREACH (OUTPATIENT)
Dept: ONCOLOGY | Facility: CLINIC | Age: 50
End: 2025-08-05
Payer: COMMERCIAL

## 2025-08-05 DIAGNOSIS — R91.8 PULMONARY NODULES: Primary | ICD-10-CM

## 2025-08-20 DIAGNOSIS — R91.8 PULMONARY NODULES: ICD-10-CM

## 2025-08-20 LAB
DLCOUNC-%PRED-PRE: 127 %
DLCOUNC-PRE: 36.88 ML/MIN/MMHG
DLCOUNC-PRED: 28.92 ML/MIN/MMHG
ERV-%PRED-PRE: 119 %
ERV-PRE: 1.7 L
ERV-PRED: 1.43 L
EXPTIME-PRE: 9.74 SEC
FEF2575-%PRED-PRE: 167 %
FEF2575-PRE: 5.47 L/SEC
FEF2575-PRED: 3.26 L/SEC
FEFMAX-%PRED-PRE: 127 %
FEFMAX-PRE: 12.36 L/SEC
FEFMAX-PRED: 9.7 L/SEC
FEV1-%PRED-PRE: 123 %
FEV1-PRE: 4.5 L
FEV1FEV6-PRE: 86 %
FEV1FEV6-PRED: 80 %
FEV1FVC-PRE: 83 %
FEV1FVC-PRED: 79 %
FEV1SVC-PRE: 86 L
FEV1SVC-PRED: 68 L
FIFMAX-PRE: 7.1 L/SEC
FRCPLETH-%PRED-PRE: 121 %
FRCPLETH-PRE: 4.25 L
FRCPLETH-PRED: 3.5 L
FVC-%PRED-PRE: 118 %
FVC-PRE: 5.43 L
FVC-PRED: 4.58 L
GAW-PRED: 1.03 L/S/CMH2O
IC-%PRED-PRE: 92 %
IC-PRE: 3.52 L
IC-PRED: 3.8 L
Lab: 104 %
RVPLETH-%PRED-PRE: 131 %
RVPLETH-PRE: 2.55 L
RVPLETH-PRED: 1.94 L
SGAW-PRED: 0.2 1/CMH2O*S
SRAW-PRED: < 4.76 CMH2O*S
TLCPLETH-%PRED-PRE: 107 %
TLCPLETH-PRE: 7.78 L
TLCPLETH-PRED: 7.22 L
VA-%PRED-PRE: 106 %
VA-PRE: 7.02 L
VC-%PRED-PRE: 98 %
VC-PRE: 5.23 L
VC-PRED: 5.32 L

## 2025-08-22 ENCOUNTER — TRANSFERRED RECORDS (OUTPATIENT)
Dept: HEALTH INFORMATION MANAGEMENT | Facility: CLINIC | Age: 50
End: 2025-08-22